# Patient Record
Sex: MALE | Race: WHITE | NOT HISPANIC OR LATINO | Employment: FULL TIME | URBAN - METROPOLITAN AREA
[De-identification: names, ages, dates, MRNs, and addresses within clinical notes are randomized per-mention and may not be internally consistent; named-entity substitution may affect disease eponyms.]

---

## 2024-09-10 VITALS
HEART RATE: 85 BPM | HEIGHT: 71 IN | WEIGHT: 169.6 LBS | DIASTOLIC BLOOD PRESSURE: 90 MMHG | BODY MASS INDEX: 23.74 KG/M2 | SYSTOLIC BLOOD PRESSURE: 125 MMHG

## 2024-09-10 DIAGNOSIS — M54.50 ACUTE RIGHT-SIDED LOW BACK PAIN WITHOUT SCIATICA: Primary | ICD-10-CM

## 2024-09-10 PROCEDURE — 99204 OFFICE O/P NEW MOD 45 MIN: CPT | Performed by: ORTHOPAEDIC SURGERY

## 2024-09-10 NOTE — LETTER
September 10, 2024     Patient: Carlos Srivastava  YOB: 1995  Date of Visit: 9/10/2024      To Whom it May Concern:    Carlos Srivastava is under my professional care. Carlos was seen in my office on 9/10/2024. Carlso may return to work at this time without restriction.    If you have any questions or concerns, please don't hesitate to call.         Sincerely,          Mervin Banuelos,         CC: No Recipients
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
## 2024-10-22 ENCOUNTER — OFFICE VISIT (OUTPATIENT)
Dept: OBGYN CLINIC | Facility: CLINIC | Age: 29
End: 2024-10-22

## 2024-10-22 VITALS
DIASTOLIC BLOOD PRESSURE: 72 MMHG | HEIGHT: 71 IN | WEIGHT: 169 LBS | HEART RATE: 81 BPM | SYSTOLIC BLOOD PRESSURE: 116 MMHG | BODY MASS INDEX: 23.66 KG/M2

## 2024-10-22 DIAGNOSIS — M54.50 ACUTE RIGHT-SIDED LOW BACK PAIN WITHOUT SCIATICA: Primary | ICD-10-CM

## 2024-10-22 PROCEDURE — 99213 OFFICE O/P EST LOW 20 MIN: CPT | Performed by: ORTHOPAEDIC SURGERY

## 2024-10-22 NOTE — LETTER
October 22, 2024     Patient: Carlos Srivastava  YOB: 1995  Date of Visit: 10/22/2024      To Whom it May Concern:    Carlos Srivastava is under my professional care. Carlos was seen in my office on 10/22/2024. Carlos is cleared for work without restriction.  If you have any questions or concerns, please don't hesitate to call.         Sincerely,          Mervin Banuelos,         CC: No Recipients
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
